# Patient Record
Sex: MALE | Race: WHITE | NOT HISPANIC OR LATINO
[De-identification: names, ages, dates, MRNs, and addresses within clinical notes are randomized per-mention and may not be internally consistent; named-entity substitution may affect disease eponyms.]

---

## 2023-07-19 ENCOUNTER — APPOINTMENT (OUTPATIENT)
Dept: FAMILY MEDICINE | Facility: CLINIC | Age: 38
End: 2023-07-19
Payer: COMMERCIAL

## 2023-07-19 VITALS
HEART RATE: 100 BPM | BODY MASS INDEX: 32.93 KG/M2 | TEMPERATURE: 97.3 F | WEIGHT: 230 LBS | OXYGEN SATURATION: 98 % | DIASTOLIC BLOOD PRESSURE: 90 MMHG | HEIGHT: 70.08 IN | SYSTOLIC BLOOD PRESSURE: 138 MMHG | RESPIRATION RATE: 17 BRPM

## 2023-07-19 DIAGNOSIS — Z72.0 TOBACCO USE: ICD-10-CM

## 2023-07-19 DIAGNOSIS — Z83.3 FAMILY HISTORY OF DIABETES MELLITUS: ICD-10-CM

## 2023-07-19 DIAGNOSIS — Z78.9 OTHER SPECIFIED HEALTH STATUS: ICD-10-CM

## 2023-07-19 DIAGNOSIS — Z87.898 PERSONAL HISTORY OF OTHER SPECIFIED CONDITIONS: ICD-10-CM

## 2023-07-19 DIAGNOSIS — Z86.39 PERSONAL HISTORY OF OTHER ENDOCRINE, NUTRITIONAL AND METABOLIC DISEASE: ICD-10-CM

## 2023-07-19 DIAGNOSIS — Z00.00 ENCOUNTER FOR GENERAL ADULT MEDICAL EXAMINATION W/OUT ABNORMAL FINDINGS: ICD-10-CM

## 2023-07-19 DIAGNOSIS — Z82.49 FAMILY HISTORY OF ISCHEMIC HEART DISEASE AND OTHER DISEASES OF THE CIRCULATORY SYSTEM: ICD-10-CM

## 2023-07-19 DIAGNOSIS — R03.0 ELEVATED BLOOD-PRESSURE READING, W/OUT DIAGNOSIS OF HYPERTENSION: ICD-10-CM

## 2023-07-19 PROCEDURE — 36415 COLL VENOUS BLD VENIPUNCTURE: CPT

## 2023-07-19 PROCEDURE — 99385 PREV VISIT NEW AGE 18-39: CPT | Mod: 25

## 2023-07-19 NOTE — ASSESSMENT
[FreeTextEntry1] : -Will obtain baseline labs for CBC,CMP, A1C, lipid, TSH, and include PTH, phosphorus, ionized calcium level given reported hx of hypercalcemia with ?thyroid level concern.\par - Pt will also fax over to the office his workup/results from previous PCP regarding elevated calcium/overactive thyroid concern in the past.

## 2023-07-19 NOTE — HEALTH RISK ASSESSMENT
[With Family] : lives with family [Employed] : employed [Feels Safe at Home] : Feels safe at home [Fully functional (bathing, dressing, toileting, transferring, walking, feeding)] : Fully functional (bathing, dressing, toileting, transferring, walking, feeding) [Fully functional (using the telephone, shopping, preparing meals, housekeeping, doing laundry, using] : Fully functional and needs no help or supervision to perform IADLs (using the telephone, shopping, preparing meals, housekeeping, doing laundry, using transportation, managing medications and managing finances) [Reports normal functional visual acuity (ie: able to read med bottle)] : Reports normal functional visual acuity [Safety elements used in home] : safety elements used in home [Good] : ~his/her~  mood as  good [Yes] : Yes [2 - 4 times a month (2 pts)] : 2-4 times a month (2 points) [1 or 2 (0 pts)] : 1 or 2 (0 points) [Never (0 pts)] : Never (0 points) [No] : In the past 12 months have you used drugs other than those required for medical reasons? No [0] : 2) Feeling down, depressed, or hopeless: Not at all (0) [PHQ-2 Negative - No further assessment needed] : PHQ-2 Negative - No further assessment needed [HIV test declined] : HIV test declined [Hepatitis C test declined] : Hepatitis C test declined [] :  [Never] : Never [0-4] : 0-4 [Audit-CScore] : 2 [XIA7Lcjhx] : 0 [Change in mental status noted] : No change in mental status noted [High Risk Behavior] : no high risk behavior [Reports changes in hearing] : Reports no changes in hearing [Reports changes in vision] : Reports no changes in vision [Reports changes in dental health] : Reports no changes in dental health [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

## 2023-07-19 NOTE — HISTORY OF PRESENT ILLNESS
[FreeTextEntry1] : New pt annual physical [de-identified] : 36 yo male presenting today as new pt for annual wellness check. Previous PCP at [facility] no longer in practice.\par Pt would like his thyroid level and calcium level checked today. Reports in past physical being told to have elevated calcium level and "overactive thyroid". Reports being told that he might possible require surgery at the time. Currently does not follow with any other providers/subspecialist for this reported hx.\par Pt also reports elevation in blood pressure in the past, no actual diagnosis of HTN. Lifestyle modification was advised, which pt has been working on. He checks his blood pressure at home, BP said to be in the 120s-130s/60s-70s most time, however has not checked much recently. Never required BP med in the past.\par H/o preDM, h/o high cholesterol level.\par Pt has been exercising more, states need to work more on his diet.\par \par Currently lives with his family, with wife and kids.\par Works in finance.\par Reports intermittent social cigarette smoking ~1-2 packs per year.\par social alcohol use\par Has received COVID Pfizer vaccines x 2 doses\par Reports last Tdap was in ~2022, received after his daughter turned 1yr old.

## 2023-07-25 ENCOUNTER — NON-APPOINTMENT (OUTPATIENT)
Age: 38
End: 2023-07-25

## 2023-07-25 LAB
ALBUMIN SERPL ELPH-MCNC: 4.8 G/DL
ALP BLD-CCNC: 117 U/L
ALT SERPL-CCNC: 54 U/L
ANION GAP SERPL CALC-SCNC: 12 MMOL/L
AST SERPL-CCNC: 47 U/L
BILIRUB SERPL-MCNC: 0.3 MG/DL
BUN SERPL-MCNC: 13 MG/DL
CA-I SERPL-SCNC: 5.8 MG/DL
CALCIUM SERPL-MCNC: 11.4 MG/DL
CALCIUM SERPL-MCNC: 11.4 MG/DL
CHLORIDE SERPL-SCNC: 106 MMOL/L
CHOLEST SERPL-MCNC: 234 MG/DL
CO2 SERPL-SCNC: 21 MMOL/L
CREAT SERPL-MCNC: 0.93 MG/DL
EGFR: 110 ML/MIN/1.73M2
ESTIMATED AVERAGE GLUCOSE: 120 MG/DL
GLUCOSE SERPL-MCNC: 91 MG/DL
HBA1C MFR BLD HPLC: 5.8 %
HDLC SERPL-MCNC: 46 MG/DL
LDLC SERPL CALC-MCNC: 164 MG/DL
NONHDLC SERPL-MCNC: 188 MG/DL
PARATHYROID HORMONE INTACT: 97 PG/ML
PHOSPHATE SERPL-MCNC: 3 MG/DL
POTASSIUM SERPL-SCNC: 4.6 MMOL/L
PROT SERPL-MCNC: 7.6 G/DL
SODIUM SERPL-SCNC: 139 MMOL/L
TRIGL SERPL-MCNC: 133 MG/DL
TSH SERPL-ACNC: 1.86 UIU/ML

## 2024-03-01 ENCOUNTER — APPOINTMENT (OUTPATIENT)
Dept: ENDOCRINOLOGY | Facility: CLINIC | Age: 39
End: 2024-03-01
Payer: COMMERCIAL

## 2024-03-01 VITALS
SYSTOLIC BLOOD PRESSURE: 120 MMHG | HEIGHT: 70.08 IN | OXYGEN SATURATION: 97 % | BODY MASS INDEX: 33.64 KG/M2 | DIASTOLIC BLOOD PRESSURE: 80 MMHG | WEIGHT: 235 LBS | HEART RATE: 88 BPM

## 2024-03-01 DIAGNOSIS — R79.89 OTHER SPECIFIED ABNORMAL FINDINGS OF BLOOD CHEMISTRY: ICD-10-CM

## 2024-03-01 DIAGNOSIS — D86.9 SARCOIDOSIS, UNSPECIFIED: ICD-10-CM

## 2024-03-01 DIAGNOSIS — E83.52 HYPERCALCEMIA: ICD-10-CM

## 2024-03-01 DIAGNOSIS — D64.9 ANEMIA, UNSPECIFIED: ICD-10-CM

## 2024-03-01 DIAGNOSIS — E55.9 VITAMIN D DEFICIENCY, UNSPECIFIED: ICD-10-CM

## 2024-03-01 PROCEDURE — 99205 OFFICE O/P NEW HI 60 MIN: CPT

## 2024-03-01 NOTE — HISTORY OF PRESENT ILLNESS
[FreeTextEntry1] : Mar 01, 2024       PCP:  Dr. 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 Moved to [place] in 2020 and was told of elevated calcium.    He will provide records he has at home. He feels well.  No history of kidney stones.  : : Constitutional:  Alert, well nourished, healthy appearance, no acute distress  Eyes:  No proptosis, no stare Thyroid: Pulmonary:  No respiratory distress, no accessory muscle use; normal rate and effort Cardiac:  Normal rate Vascular:  Endocrine:  No stigmata of Cushings Syndrome Musculoskeletal:  Normal gait, no involuntary movements Neurology:  No tremors Affect/Mood/Psych:  Oriented x 3; normal affect, normal insight/judgement, normal mood  .   Impression:  Careful evauation by Dr. Rodriguez indicates hyperparathyroidism in an otherwise health 39 yo  Plan:  Updated labs, US thyroid/parathyroid; 24 hour urine calcium, and sestamibi parathyroid scan in anticipation of likely referral for opinion of a skilled parathyroid surgeon.

## 2024-03-07 ENCOUNTER — RESULT REVIEW (OUTPATIENT)
Age: 39
End: 2024-03-07

## 2024-04-13 ENCOUNTER — RESULT REVIEW (OUTPATIENT)
Age: 39
End: 2024-04-13

## 2024-07-31 ENCOUNTER — APPOINTMENT (OUTPATIENT)
Dept: FAMILY MEDICINE | Facility: CLINIC | Age: 39
End: 2024-07-31
Payer: COMMERCIAL

## 2024-07-31 VITALS
OXYGEN SATURATION: 98 % | SYSTOLIC BLOOD PRESSURE: 124 MMHG | HEIGHT: 68.5 IN | BODY MASS INDEX: 34.76 KG/M2 | HEART RATE: 64 BPM | RESPIRATION RATE: 16 BRPM | TEMPERATURE: 98.7 F | WEIGHT: 232 LBS | DIASTOLIC BLOOD PRESSURE: 76 MMHG

## 2024-07-31 DIAGNOSIS — I10 ESSENTIAL (PRIMARY) HYPERTENSION: ICD-10-CM

## 2024-07-31 DIAGNOSIS — E78.5 HYPERLIPIDEMIA, UNSPECIFIED: ICD-10-CM

## 2024-07-31 DIAGNOSIS — Z00.00 ENCOUNTER FOR GENERAL ADULT MEDICAL EXAMINATION W/OUT ABNORMAL FINDINGS: ICD-10-CM

## 2024-07-31 DIAGNOSIS — Z87.891 PERSONAL HISTORY OF NICOTINE DEPENDENCE: ICD-10-CM

## 2024-07-31 DIAGNOSIS — R73.03 PREDIABETES.: ICD-10-CM

## 2024-07-31 DIAGNOSIS — D35.1 BENIGN NEOPLASM OF PARATHYROID GLAND: ICD-10-CM

## 2024-07-31 DIAGNOSIS — E04.2 NONTOXIC MULTINODULAR GOITER: ICD-10-CM

## 2024-07-31 PROCEDURE — 36415 COLL VENOUS BLD VENIPUNCTURE: CPT

## 2024-07-31 PROCEDURE — 99395 PREV VISIT EST AGE 18-39: CPT

## 2024-07-31 RX ORDER — OLMESARTAN MEDOXOMIL AND HYDROCHLOROTHIAZIDE 20; 12.5 MG/1; MG/1
20-12.5 TABLET ORAL
Qty: 90 | Refills: 0 | Status: DISCONTINUED | COMMUNITY
End: 2024-07-31

## 2024-07-31 NOTE — PHYSICAL EXAM
[No Acute Distress] : no acute distress [Well Nourished] : well nourished [Well-Appearing] : well-appearing [Normal Sclera/Conjunctiva] : normal sclera/conjunctiva [EOMI] : extraocular movements intact [No Carotid Bruits] : no carotid bruits [No Abdominal Bruit] : a ~M bruit was not heard ~T in the abdomen [No Varicosities] : no varicosities [Pedal Pulses Present] : the pedal pulses are present [No Edema] : there was no peripheral edema [No Palpable Aorta] : no palpable aorta [No Extremity Clubbing/Cyanosis] : no extremity clubbing/cyanosis [Coordination Grossly Intact] : coordination grossly intact [No Focal Deficits] : no focal deficits [Normal Gait] : normal gait [Speech Grossly Normal] : speech grossly normal [Memory Grossly Normal] : memory grossly normal [Alert and Oriented x3] : oriented to person, place, and time [Normal Mood] : the mood was normal [Normal] : affect was normal and insight and judgment were intact

## 2024-08-01 RX ORDER — OLMESARTAN MEDOXOMIL 20 MG/1
20 TABLET, FILM COATED ORAL
Qty: 90 | Refills: 0 | Status: ACTIVE | COMMUNITY
Start: 2024-07-31 | End: 1900-01-01

## 2024-08-02 PROBLEM — E04.2 MULTIPLE THYROID NODULES: Status: ACTIVE | Noted: 2024-07-31

## 2024-08-02 NOTE — ASSESSMENT
[FreeTextEntry1] : will reevaluate PTH, TSH, calcium levels via blood work. Will expedite Endocrine appointment if warranted switch olmesartan-HCTZ to olmesartan alone at 20mg daily as HCTZ can further worsen current hypercalcemia-discussed with pt today, who verbalized understanding.

## 2024-08-02 NOTE — HEALTH RISK ASSESSMENT
[1 or 2 (0 pts)] : 1 or 2 (0 points) [Never (0 pts)] : Never (0 points) [Little interest or pleasure doing things] : 1) Little interest or pleasure doing things [Feeling down, depressed, or hopeless] : 2) Feeling down, depressed, or hopeless [0] : 2) Feeling down, depressed, or hopeless: Not at all (0) [Fair] : ~his/her~ current health as fair  [Good] : ~his/her~  mood as  good [No] : In the past 12 months have you used drugs other than those required for medical reasons? No [PHQ-2 Negative - No further assessment needed] : PHQ-2 Negative - No further assessment needed [Former] : Former [0-4] : 0-4 [NO] : No [Hepatitis C test offered] : Hepatitis C test offered [With Family] : lives with family [Employed] : employed [] :  [Feels Safe at Home] : Feels safe at home [Safety elements used in home] : safety elements used in home [Seat Belt] :  uses seat belt [Audit-CScore] : 0 [[id number]] : 0 [de-identified] : reports quitting smoking within 1 yr ago [High Risk Behavior] : no high risk behavior [Reports changes in hearing] : Reports no changes in hearing [Reports changes in vision] : Reports no changes in vision [Reports changes in dental health] : Reports no changes in dental health [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

## 2024-08-02 NOTE — HISTORY OF PRESENT ILLNESS
[FreeTextEntry1] : Annual physical [de-identified] : Pt is here for routine annual physical examination. Last seen 7/2023. Since seen by me, pt followed with Endocrinologist, Dr. Hellerman for hyperparathyroidism with hypercalcemia. He was sent for US of thyroid and parathyroid, with noted left inferior parathyroid adenoma and thyroid nodules. Pt has his next appointment with Endo in 10/2024. He was also seen in the ER in June for chest pains, reports normal EKG at the urgent care. Pt subsequently saw a Cardiologist within [facility], had TTE done with EF measured at 65-70%, concentric remodeling of LV reported. US carotids wnl US arterial renal artery also wnl. He was started on Olmesartan-HCTZ 20-12.5mg daily by Cardiologist seen ~1 month ago.  Pt had all results on pt portal on phone.  He reports joint pains, generalized weakness, occasionally feels dizzy, with headaches. Pt reports quitting smoking and alcohol intake. He has been eating more healthier, also going to the gym now to exercise. Pt is concerned that BP medication is causing his above sxs.

## 2024-08-02 NOTE — HEALTH RISK ASSESSMENT
[1 or 2 (0 pts)] : 1 or 2 (0 points) [Never (0 pts)] : Never (0 points) [Little interest or pleasure doing things] : 1) Little interest or pleasure doing things [Feeling down, depressed, or hopeless] : 2) Feeling down, depressed, or hopeless [0] : 2) Feeling down, depressed, or hopeless: Not at all (0) [Fair] : ~his/her~ current health as fair  [Good] : ~his/her~  mood as  good [No] : In the past 12 months have you used drugs other than those required for medical reasons? No [PHQ-2 Negative - No further assessment needed] : PHQ-2 Negative - No further assessment needed [Former] : Former [0-4] : 0-4 [NO] : No [Hepatitis C test offered] : Hepatitis C test offered [With Family] : lives with family [Employed] : employed [] :  [Feels Safe at Home] : Feels safe at home [Safety elements used in home] : safety elements used in home [Seat Belt] :  uses seat belt [Audit-CScore] : 0 [GQL8Isurn] : 0 [de-identified] : reports quitting smoking within 1 yr ago [High Risk Behavior] : no high risk behavior [Reports changes in hearing] : Reports no changes in hearing [Reports changes in vision] : Reports no changes in vision [Reports changes in dental health] : Reports no changes in dental health [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

## 2024-08-02 NOTE — HISTORY OF PRESENT ILLNESS
[FreeTextEntry1] : Annual physical [de-identified] : Pt is here for routine annual physical examination. Last seen 7/2023. Since seen by me, pt followed with Endocrinologist, Dr. Hellerman for hyperparathyroidism with hypercalcemia. He was sent for US of thyroid and parathyroid, with noted left inferior parathyroid adenoma and thyroid nodules. Pt has his next appointment with Endo in 10/2024. He was also seen in the ER in June for chest pains, reports normal EKG at the urgent care. Pt subsequently saw a Cardiologist within [facility], had TTE done with EF measured at 65-70%, concentric remodeling of LV reported. US carotids wnl US arterial renal artery also wnl. He was started on Olmesartan-HCTZ 20-12.5mg daily by Cardiologist seen ~1 month ago.  Pt had all results on pt portal on phone.  He reports joint pains, generalized weakness, occasionally feels dizzy, with headaches. Pt reports quitting smoking and alcohol intake. He has been eating more healthier, also going to the gym now to exercise. Pt is concerned that BP medication is causing his above sxs.

## 2024-08-02 NOTE — HISTORY OF PRESENT ILLNESS
[FreeTextEntry1] : Annual physical [de-identified] : Pt is here for routine annual physical examination. Last seen 7/2023. Since seen by me, pt followed with Endocrinologist, Dr. Hellerman for hyperparathyroidism with hypercalcemia. He was sent for US of thyroid and parathyroid, with noted left inferior parathyroid adenoma and thyroid nodules. Pt has his next appointment with Endo in 10/2024. He was also seen in the ER in June for chest pains, reports normal EKG at the urgent care. Pt subsequently saw a Cardiologist within [facility], had TTE done with EF measured at 65-70%, concentric remodeling of LV reported. US carotids wnl US arterial renal artery also wnl. He was started on Olmesartan-HCTZ 20-12.5mg daily by Cardiologist seen ~1 month ago.  Pt had all results on pt portal on phone.  He reports joint pains, generalized weakness, occasionally feels dizzy, with headaches. Pt reports quitting smoking and alcohol intake. He has been eating more healthier, also going to the gym now to exercise. Pt is concerned that BP medication is causing his above sxs.

## 2024-08-02 NOTE — HEALTH RISK ASSESSMENT
[1 or 2 (0 pts)] : 1 or 2 (0 points) [Never (0 pts)] : Never (0 points) [Little interest or pleasure doing things] : 1) Little interest or pleasure doing things [Feeling down, depressed, or hopeless] : 2) Feeling down, depressed, or hopeless [0] : 2) Feeling down, depressed, or hopeless: Not at all (0) [Fair] : ~his/her~ current health as fair  [Good] : ~his/her~  mood as  good [No] : In the past 12 months have you used drugs other than those required for medical reasons? No [PHQ-2 Negative - No further assessment needed] : PHQ-2 Negative - No further assessment needed [Former] : Former [0-4] : 0-4 [NO] : No [Hepatitis C test offered] : Hepatitis C test offered [With Family] : lives with family [Employed] : employed [] :  [Feels Safe at Home] : Feels safe at home [Safety elements used in home] : safety elements used in home [Seat Belt] :  uses seat belt [Audit-CScore] : 0 [[id number]] : 0 [de-identified] : reports quitting smoking within 1 yr ago [High Risk Behavior] : no high risk behavior [Reports changes in hearing] : Reports no changes in hearing [Reports changes in vision] : Reports no changes in vision [Reports changes in dental health] : Reports no changes in dental health [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

## 2024-08-05 ENCOUNTER — TRANSCRIPTION ENCOUNTER (OUTPATIENT)
Age: 39
End: 2024-08-05

## 2024-08-06 LAB
ANION GAP SERPL CALC-SCNC: 12 MMOL/L
BUN SERPL-MCNC: 9 MG/DL
CALCIUM SERPL-MCNC: 10.8 MG/DL
CALCIUM SERPL-MCNC: 10.8 MG/DL
CHLORIDE SERPL-SCNC: 104 MMOL/L
CHOLEST SERPL-MCNC: 186 MG/DL
CO2 SERPL-SCNC: 24 MMOL/L
CREAT SERPL-MCNC: 0.87 MG/DL
EGFR: 115 ML/MIN/1.73M2
ESTIMATED AVERAGE GLUCOSE: 120 MG/DL
GLUCOSE SERPL-MCNC: 94 MG/DL
HBA1C MFR BLD HPLC: 5.8 %
HCV AB SER QL: NONREACTIVE
HCV S/CO RATIO: 0.15 S/CO
HDLC SERPL-MCNC: 35 MG/DL
LDLC SERPL CALC-MCNC: 91 MG/DL
NONHDLC SERPL-MCNC: 151 MG/DL
PARATHYROID HORMONE INTACT: 131 PG/ML
POTASSIUM SERPL-SCNC: 4.3 MMOL/L
SODIUM SERPL-SCNC: 140 MMOL/L
TRIGL SERPL-MCNC: 360 MG/DL
TSH SERPL-ACNC: 2.05 UIU/ML

## 2024-08-16 ENCOUNTER — APPOINTMENT (OUTPATIENT)
Dept: ENDOCRINOLOGY | Facility: CLINIC | Age: 39
End: 2024-08-16

## 2024-08-16 VITALS
OXYGEN SATURATION: 99 % | SYSTOLIC BLOOD PRESSURE: 134 MMHG | WEIGHT: 227 LBS | HEART RATE: 70 BPM | DIASTOLIC BLOOD PRESSURE: 80 MMHG | BODY MASS INDEX: 34.01 KG/M2 | HEIGHT: 68.5 IN

## 2024-08-16 DIAGNOSIS — R79.89 OTHER SPECIFIED ABNORMAL FINDINGS OF BLOOD CHEMISTRY: ICD-10-CM

## 2024-08-16 DIAGNOSIS — E83.52 HYPERCALCEMIA: ICD-10-CM

## 2024-08-16 PROCEDURE — 99215 OFFICE O/P EST HI 40 MIN: CPT

## 2024-08-22 ENCOUNTER — RESULT REVIEW (OUTPATIENT)
Age: 39
End: 2024-08-22

## 2024-08-23 NOTE — HISTORY OF PRESENT ILLNESS
[FreeTextEntry1] : Mar 01, 2024       PCP:  Dr. 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    He will provide records he has at home. He feels well.  No history of kidney stones.  : : Constitutional:  Alert, well nourished, healthy appearance, no acute distress  Eyes:  No proptosis, no stare Thyroid: Pulmonary:  No respiratory distress, no accessory muscle use; normal rate and effort Cardiac:  Normal rate Vascular:  Endocrine:  No stigmata of Cushings Syndrome Musculoskeletal:  Normal gait, no involuntary movements Neurology:  No tremors Affect/Mood/Psych:  Oriented x 3; normal affect, normal insight/judgement, normal mood  .   Impression:  Careful evauation by Dr. Rodriguez indicates hyperparathyroidism in an otherwise health 37 yo  Plan:  Updated labs, US thyroid/parathyroid; 24 hour urine calcium, and sestamibi parathyroid scan in anticipation of likely referral for opinion of a skilled parathyroid surgeon.

## 2024-08-26 ENCOUNTER — APPOINTMENT (OUTPATIENT)
Dept: SURGERY | Facility: CLINIC | Age: 39
End: 2024-08-26
Payer: COMMERCIAL

## 2024-08-26 VITALS
BODY MASS INDEX: 34.55 KG/M2 | HEIGHT: 68.5 IN | OXYGEN SATURATION: 98 % | TEMPERATURE: 98.3 F | DIASTOLIC BLOOD PRESSURE: 95 MMHG | SYSTOLIC BLOOD PRESSURE: 149 MMHG | HEART RATE: 61 BPM | WEIGHT: 230.6 LBS

## 2024-08-26 DIAGNOSIS — E04.2 NONTOXIC MULTINODULAR GOITER: ICD-10-CM

## 2024-08-26 DIAGNOSIS — R79.89 OTHER SPECIFIED ABNORMAL FINDINGS OF BLOOD CHEMISTRY: ICD-10-CM

## 2024-08-26 DIAGNOSIS — D35.1 BENIGN NEOPLASM OF PARATHYROID GLAND: ICD-10-CM

## 2024-08-26 DIAGNOSIS — E83.52 HYPERCALCEMIA: ICD-10-CM

## 2024-08-26 PROCEDURE — 99204 OFFICE O/P NEW MOD 45 MIN: CPT

## 2024-08-26 RX ORDER — CALCIUM CARBONATE/VITAMIN D3 600 MG-10
TABLET ORAL
Refills: 0 | Status: ACTIVE | COMMUNITY

## 2024-08-27 NOTE — REASON FOR VISIT
[Consultation] : a consultation visit [FreeTextEntry1] : Patient referred by Dr. Hellerman for the evaluation of primary hyperparathyroidism

## 2024-08-27 NOTE — HISTORY OF PRESENT ILLNESS
[de-identified] : Patient is a 38-year-old man who was sent by endocrinology for the evaluation of hyperparathyroidism with hypercalcemia.  In 2020 before the pandemic he says that he had been evaluated for similar complaints in the [facility] system but because of the pandemic nothing was done he has been doing relatively well he has no complaints nor kidney stones nor other overt signs of hyper calcium Jayleen.  He did have chest pain in June 2024 that he reportedly had a normal EKG at the urgent care and saw a cardiologist with [facility] he had a SEBASTIÁN echo that is reported elsewhere in the medical record as an EF of 65 to 70% with concentric remodeling of the LV reported  He is followed up with the cardiologist and has not had chest pain again  He has no abdominal pain no muscle pain no memory loss no cognitive impairments that I can tell nor he reports  He did get an ultrasound of the thyroid that demonstrates a normal right side of the thyroid however the left thyroid has 2 nodules in the thyroid itself one of them is over 2 cm and he has what is reported as a posterior parathyroid nodule on the left side in the inferior position.  Patient has no complaints of swallowing or feelings of fullness in the neck otherwise is reportedly relatively feeling well and he looks well

## 2024-08-27 NOTE — PLAN
[FreeTextEntry1] : We will reconvene over the phone on Thursday and make a plan for proceeding forward  He is agreeable to this plan and encouraged to call back sooner should he have any questions  I spent 45 minutes in this consultation with reviewing the risks and benefits of the both procedures reviewing the imaging and documenting my findings

## 2024-08-27 NOTE — DATA REVIEWED
[FreeTextEntry1] : Actual images of ultrasound were reviewed with the patient  PTH and calcium's were also reviewed from his medical record  He has had an elevated calcium and parathyroid going back at least to 2023 in our medical records and he reports that it is further back in records he has at home

## 2024-08-27 NOTE — ASSESSMENT
[FreeTextEntry1] : My impression is the patient has primary hyperparathyroidism likely secondary to an inferior left parathyroid adenoma.  Confounding this is he has 2 nodules on the left thyroid gland that are approaching size for fine-needle aspiration  We would have to fully mobilize the thyroid on the left side to get at the inferior parathyroid gland and this would make any further operations on the left side much more risky for the patient  We reviewed the films and talked about that it is not normal to have thyroid nodules and that given his age he would need serial ultrasounds and likely biopsies in the future and should the mass in the left thyroid be problematic reoperative surgery after having a left thyroid mobilization for parathyroidectomy would be increased risk for nerve injury  My recommendation in this case would be to do an excision of the left thyroid and left parathyroid with intraoperative parathyroid hormone monitoring and intraoperative nerve monitoring  Patient was unaware of the thyroid nodules complicating the issue and wants to think about this and I completely agree with that I certainly would entertain the option of biopsying the larger left thyroid nodule and if it is clearly benign with Cameron category II I would proceed with a minimally invasive focused parathyroidectomy on the left lower parathyroid with intraoperative parathyroid hormone monitoring  This would be under the caveat that it puts the reoperation of the left gland at higher risk in the future  I did talk about removing the left thyroid and that almost always the right thyroid will have enough thyroid hormone to compensate and he would not need medication necessarily for this should we proceed along this route  By the end of the discussion he seems to be considering proceeding with my recommendation I will discuss this also with his referring endocrinologist  We left it that I am going to call him on Thursday morning to have another discussion about what we had discussed today and decide on an operative plan  He is off for the entire month of September and wants to take care of this in September so we will likely proceed with one of the interventions outlined above   We talked about primary hyperparathyroidism and how that it is often caused by a single benign tumor called a parathyroid adenoma During this visit we had at least a 30-minute discussion just on thyroid surgery itself.  For most tumors that are less than 4 cm the initial treatment plan is a total thyroid lobectomy on the side where the tumor is.  This involves removing the entire affected lobe and isthmus of the thyroid.  This conservative approach preserves the contralateral side of the thyroid to provide normal thyroid gland function and decreases the need for postoperative thyroid medication.  One sided surgery also decreases this incidence of some of the complications associated with total thyroidectomy that are discussed in detail below.  Intraoperatively, based on findings of extrathyroidal extension or lymph node involvement there is a chance perform a total thyroid lobectomy at the initial operation.   The risks of thyroid surgery were discussed and include bleeding and hematoma formation in the early postoperative period that may result in early reoperation, but the chance that this is 1% or less.  Patient can have the recurrent laryngeal nerve injured on the affected side that can affect the voice.  The incidence of transient nerve dysfunction after thyroidectomy is 3 to 5%, but most, 99%, of patients with perioperative hoarseness have improved to normal function in 2 to 6 weeks after surgery.  Permanent injury of recurrent laryngeal nerve after thyroid surgery is about 1%, this is treatable by procedures often performed by ear nose and throat specialists that focus on voice.  Injury to both the recurrent laryngeal nerves during a total thyroidectomy is extremely rare and rarely involves placement of a tracheostomy tube to assist with breathing. This happens less than .1% of the time (1 in a 1000 cases) .    There is the chance of injury to the Parathyroid Glands during thyroid surgery. These are four small glands associated with the posterior aspect of the thyroid that control the body's blood calcium levels.  Care is taken to identify and preserve the small glands during the surgery, but in some cases of total thyroidectomy there is transient dysfunction of these glands and patients can have low calcium levels immediately post op.  The incidence of postoperative parathyroid dysfunction manifesting in decreased calcium levels is seen after total thyroidectomy is about 10%.  This transient decrease in calcium is treated in all patients with postoperative Tums , which is 500 mg calcium carbonate 2 tablets 4 times a day, for the first week after surgery, with or without the addition of Rocaltrol (which is oral vitamin D).  These medications get slowly tapered off in the postoperative period and calcium levels in the blood are usually normal by second postoperative visit at 4-5 weeks after surgery.    The surgery itself takes between One and Three hours based on extent of surgery and occurs through a small transverse mid midline neck incision.  All operations are done under general endotracheal anesthesia with intraoperative nerve monitoring through the endotracheal tube. Most patients can be discharged after 3 hours of observation the same day of surgery however in most cases of total thyroidectomy the patient is kept overnight for monitoring and calcium checks.  In the usual postoperative course, the patient is left with a small suture in their neck that needs to be removed between 5 to 7 days after surgery for the best cosmetic result.  We also see the patient back at 4-5 weeks after surgery to make sure further treatment is planned if needed, as well as to discuss pathology, check the wound, and adjust any medications.  In the setting of thyroid lobectomy on one side, the incidence of requiring postoperative thyroid medication is 10 to 15%.  If a total thyroidectomy is performed thyroid medication will be needed in almost all cases.  If total thyroidectomy is performed, you will be started on a weight-based dose of Synthroid in the early postoperative period.   Permanant pathologic results are usually available between 7-10 days after surgery.  If the lesion has high risk features on permanent pathology, we may suggest returning to the operating room in the early postoperative period between 1 and 2 weeks to complete the thyroidectomy to allow the option for postoperative radioactive iodine treatment.  The presence of a remnant thyroid makes postoperative radioactive iodine treatment much more difficult and also the presence of remnant thyroid does not allow us to monitor Thyroglobulin (Tg level) postoperatively for tumor recurrence.  The discussion of radioactive iodine and thyroglobulin monitoring will be discussed at length postoperatively based on pathologic findings.   All of the above was discussed with the patient and informed consent for the operation was obtained based on the above discussion and the patients acknowledgement of understanding of the risks and benefits of the proposed procedure.

## 2024-08-27 NOTE — PHYSICAL EXAM
[de-identified] : Patient is a well-developed man with no distress [de-identified] : No exophthalmos no jaundiced [de-identified] : Full neck muscular not obese slight asymmetry left side greater than right however the structure of the neck does not not yield overt nodularity nor any lymphadenopathy

## 2024-08-29 ENCOUNTER — NON-APPOINTMENT (OUTPATIENT)
Age: 39
End: 2024-08-29

## 2024-09-11 ENCOUNTER — RESULT REVIEW (OUTPATIENT)
Age: 39
End: 2024-09-11

## 2024-09-12 ENCOUNTER — RESULT REVIEW (OUTPATIENT)
Age: 39
End: 2024-09-12

## 2024-09-13 ENCOUNTER — RESULT REVIEW (OUTPATIENT)
Age: 39
End: 2024-09-13

## 2024-09-13 ENCOUNTER — TRANSCRIPTION ENCOUNTER (OUTPATIENT)
Age: 39
End: 2024-09-13

## 2024-09-13 ENCOUNTER — APPOINTMENT (OUTPATIENT)
Dept: SURGERY | Facility: HOSPITAL | Age: 39
End: 2024-09-13

## 2024-09-19 ENCOUNTER — APPOINTMENT (OUTPATIENT)
Dept: SURGERY | Facility: CLINIC | Age: 39
End: 2024-09-19
Payer: COMMERCIAL

## 2024-09-19 VITALS
OXYGEN SATURATION: 98 % | DIASTOLIC BLOOD PRESSURE: 97 MMHG | SYSTOLIC BLOOD PRESSURE: 136 MMHG | TEMPERATURE: 98.7 F | HEART RATE: 64 BPM

## 2024-09-19 DIAGNOSIS — R79.89 OTHER SPECIFIED ABNORMAL FINDINGS OF BLOOD CHEMISTRY: ICD-10-CM

## 2024-09-19 DIAGNOSIS — E04.2 NONTOXIC MULTINODULAR GOITER: ICD-10-CM

## 2024-09-19 DIAGNOSIS — Z86.018 PERSONAL HISTORY OF OTHER BENIGN NEOPLASM: ICD-10-CM

## 2024-09-19 DIAGNOSIS — Z86.39 PERSONAL HISTORY OF OTHER ENDOCRINE, NUTRITIONAL AND METABOLIC DISEASE: ICD-10-CM

## 2024-09-19 PROCEDURE — 99024 POSTOP FOLLOW-UP VISIT: CPT

## 2024-09-19 NOTE — PHYSICAL EXAM
[de-identified] : Looks well no jaundice no exophthalmos [de-identified] : Soft well-healed incisionSuture removed without difficulty

## 2024-09-19 NOTE — ASSESSMENT
[FreeTextEntry1] : Patient is doing well after his left thyroidectomy and neck exploration with intraoperative parathyroid hormone assay for primary hyperparathyroidism secondary to a left large parathyroid adenoma  His voice is strong wound is well-healed took his stitches out in the office and he is can have an excellent cosmetic result  My plan is to draw labs including a calcium and TSH levels in 4 weeks and see him back in the office in 5

## 2024-09-19 NOTE — DATA REVIEWED
[FreeTextEntry1] : [facility]  Results Results Hx Details Questions Add'l Details Charging Encounter Annotations Results:	  [facility]             Final  No Documents Attached    	 [facility]  SPEC #: [id number]          RECD: 09/13/     STATUS: [facility] #: [id number]            AVE: 09/13/24- ENTERED:  09/13/    SP TYPE: SURGICAL SUBM DR: Warren Newsome MD [facility] DR: NO PRIMARY CARE PHYSICIAN    FINAL DIAGNOSIS   A.  PARATHYROID, LEFT LOWER: - Enlarged hypercellular parathyroid gland (2 gms), consistent with adenoma in appropriate clinical setting.  B.  LEFT THYROID: - Benign thyroid tissue with multiple adenomatous/hyperplastic nodules (largest 1.8 cm), some with degenerative changes - Benign parathyroid gland, inferior  C. LEFT PARATHYROID LYMPH NODE: - Benign lymph node  DIAGNOSIS COMMENT

## 2024-09-19 NOTE — HISTORY OF PRESENT ILLNESS
[de-identified] : Patient is status post left thyroidectomy and removal of enlarged left parathyroid adenoma.  He has done well since surgery and voice is strong and wound is well-healed and he is back to his activities he still has the operative dressing intact

## 2024-09-23 ENCOUNTER — APPOINTMENT (OUTPATIENT)
Dept: FAMILY MEDICINE | Facility: CLINIC | Age: 39
End: 2024-09-23
Payer: COMMERCIAL

## 2024-09-23 VITALS
HEART RATE: 66 BPM | HEIGHT: 68 IN | DIASTOLIC BLOOD PRESSURE: 68 MMHG | WEIGHT: 233 LBS | SYSTOLIC BLOOD PRESSURE: 130 MMHG | OXYGEN SATURATION: 98 % | BODY MASS INDEX: 35.31 KG/M2 | TEMPERATURE: 98.6 F

## 2024-09-23 DIAGNOSIS — E55.9 VITAMIN D DEFICIENCY, UNSPECIFIED: ICD-10-CM

## 2024-09-23 DIAGNOSIS — R42 DIZZINESS AND GIDDINESS: ICD-10-CM

## 2024-09-23 DIAGNOSIS — I10 ESSENTIAL (PRIMARY) HYPERTENSION: ICD-10-CM

## 2024-09-23 DIAGNOSIS — Z98.890 OTHER SPECIFIED POSTPROCEDURAL STATES: ICD-10-CM

## 2024-09-23 PROCEDURE — 99213 OFFICE O/P EST LOW 20 MIN: CPT

## 2024-09-24 NOTE — PHYSICAL EXAM
[No Carotid Bruits] : no carotid bruits [Normal] : normal gait, coordination grossly intact, no focal deficits and deep tendon reflexes were 2+ and symmetric [de-identified] : CN II-XII grossly intact

## 2024-09-24 NOTE — PHYSICAL EXAM
[No Carotid Bruits] : no carotid bruits [Normal] : normal gait, coordination grossly intact, no focal deficits and deep tendon reflexes were 2+ and symmetric [de-identified] : CN II-XII grossly intact

## 2024-09-24 NOTE — HISTORY OF PRESENT ILLNESS
[FreeTextEntry1] : F/u after thyroid surgery  [de-identified] : Patient is a 38-year-old male with history of HLD, HTN, pre-DM, elevated TSH, parathyroid adenoma status post left thyroidectomy and removal of left parathyroid adenoma 9/13.  Presenting to clinic for follow-up after thyroid surgery.  Reviewed previous results and labs Per last note plan for Endo to repeat TSH in 4 weeks and office visit in 5 Reviewed path results-benign  Patient notes since surgery has felt okay however with some persistent lightheadedness/dizziness which ha gotten somewhat better after surgery but still occurring   -No HAs, no ringing in ears/hearing loss - no recent cough/cold sxs  -Feels as though sometimes with change in positon of head though not necessarily with getting up/laying down -Doesn't feel like room is spinning feels as if he is off balance  -Drinks 3-4 L per day of water  -Denies other neuro sxs      Laying down BP: 130/80 Sitting BP:142/86 Standing 135/84

## 2024-09-24 NOTE — HISTORY OF PRESENT ILLNESS
[FreeTextEntry1] : F/u after thyroid surgery  [de-identified] : Patient is a 38-year-old male with history of HLD, HTN, pre-DM, elevated TSH, parathyroid adenoma status post left thyroidectomy and removal of left parathyroid adenoma 9/13.  Presenting to clinic for follow-up after thyroid surgery.  Reviewed previous results and labs Per last note plan for Endo to repeat TSH in 4 weeks and office visit in 5 Reviewed path results-benign  Patient notes since surgery has felt okay however with some persistent lightheadedness/dizziness which ha gotten somewhat better after surgery but still occurring   -No HAs, no ringing in ears/hearing loss - no recent cough/cold sxs  -Feels as though sometimes with change in positon of head though not necessarily with getting up/laying down -Doesn't feel like room is spinning feels as if he is off balance  -Drinks 3-4 L per day of water  -Denies other neuro sxs      Laying down BP: 130/80 Sitting BP:142/86 Standing 135/84